# Patient Record
Sex: FEMALE | URBAN - METROPOLITAN AREA
[De-identification: names, ages, dates, MRNs, and addresses within clinical notes are randomized per-mention and may not be internally consistent; named-entity substitution may affect disease eponyms.]

---

## 2021-01-24 ENCOUNTER — EMERGENCY (EMERGENCY)
Facility: HOSPITAL | Age: 46
LOS: 1 days | Discharge: HOME | End: 2021-01-24
Attending: EMERGENCY MEDICINE
Payer: MEDICAID

## 2021-01-24 VITALS
HEART RATE: 68 BPM | TEMPERATURE: 98 F | SYSTOLIC BLOOD PRESSURE: 137 MMHG | OXYGEN SATURATION: 99 % | WEIGHT: 253.53 LBS | RESPIRATION RATE: 18 BRPM | DIASTOLIC BLOOD PRESSURE: 81 MMHG | HEIGHT: 66 IN

## 2021-01-24 PROCEDURE — 73130 X-RAY EXAM OF HAND: CPT

## 2021-01-24 PROCEDURE — 99283 EMERGENCY DEPT VISIT LOW MDM: CPT | Mod: 25

## 2021-01-24 PROCEDURE — 29130 APPL FINGER SPLINT STATIC: CPT | Mod: F6

## 2021-01-24 PROCEDURE — 99284 EMERGENCY DEPT VISIT MOD MDM: CPT | Mod: 25

## 2021-01-24 PROCEDURE — 73130 X-RAY EXAM OF HAND: CPT | Mod: 26,RT

## 2021-01-24 RX ORDER — ACETAMINOPHEN 500 MG
2 TABLET ORAL
Qty: 30 | Refills: 0
Start: 2021-01-24

## 2021-01-24 RX ORDER — IBUPROFEN 200 MG
1 TABLET ORAL
Qty: 30 | Refills: 0
Start: 2021-01-24

## 2021-01-24 NOTE — ED PROVIDER NOTE - MUSCULOSKELETAL, MLM
Soft tissue swelling of the proximal right phalanx of the right second digit. Decreased ROM secondary to pain. No tenderness to palpation of the middle and distal phalanx. No tenderness at the left MCP joint.

## 2021-01-24 NOTE — ED PROVIDER NOTE - CARE PLAN
Principal Discharge DX:	Closed nondisplaced fracture of middle phalanx of right index finger, initial encounter

## 2021-01-24 NOTE — ED PROVIDER NOTE - OBJECTIVE STATEMENT
45 year old female presenting to the ED with right index injury today. Patient states that she was bringing her hand back to strike something when she struck her proximal phalangeal joint on a piece of wood. Patient has had pain since and swelling. Patient with decreased ROM secondary to pain. Denies any weakness, numbness, or any other acute complaints.

## 2022-11-14 NOTE — ED PROVIDER NOTE - CONSTITUTIONAL, MLM
Amilcar   : 1984  Primary: Kuldeep Allison  Secondary:  Katie Dodson @ [address] [place] [address] Way [address]  Phone: [phone]  Fax: [phone] Plan Frequency: 2x/week for an additional 12 weeks    Plan of Care/Certification Expiration Date: 22      PT Visit Info: Total # of Visits to Date: 36     Visit Count:  36   OUTPATIENT PHYSICAL THERAPY:OP NOTE TYPE: Treatment Note 2022       Episode  Appt Desk             Treatment Diagnosis:  Pain in Right Shoulder (M25.511)  Stiffness of Right Shoulder, Not elsewhere classified (M25.611)   Strain of muscle(s) and tendon(s) of the rotator cuff of right shoulder, sequela (S46.011S)  Medical/Referring Diagnosis: s/p  Arthroscopy of right shoulder, arthroscopic subacromial decompression, arthroscopic distal clavicle resection, extensive debridement of SLAP tear, biceps labral complex, glenohumeral joint, subacromial space, mini-open rotator cuff repair and biceps tenodesis. Strain of muscle(s) and tendon(s) of the rotator cuff of right shoulder, initial encounter [S46.011A]  Referring Physician:  Pipe Cannon MD MD Orders:  eval & treat, home exercise program and Full Motion, Full Strength, Aggressive; 2x/week x6 weeks. (10/11/22)   Date of Onset:  Onset Date: 22 (surgery; 22 injury)  Allergies: Duloxetine and Erythromycin  Restrictions/Precautions: Post op restrictions and precautions R shoulder. Interventions Planned (Treatment may consist of any combination of the following):    Current Treatment Recommendations: Strengthening; ROM; Manual Therapy - Soft Tissue Mobilization; Manual Therapy - Joint Manipulation; Pain management; Home exercise program; Modalities     Subjective Comments: Pt states \"I was sore last night. \"  Initial:    6/10   Post Session:       no increase reported  Medications Last Reviewed: Naproxen; gabapen 2022  Updated Objective Findings:    ROM:   Not measured. Strength:   Not measured. Treatment   Active warm up on UBE. (5 min)  THERAPEUTIC EXERCISE: (40 minutes): Exercises for R shoulder motion and to increase ROM. Pt was supine with the scapula stabilized for all motions. Exercises for shoulder girdle strength and stabilization done as per grid. Verbal and tactile cues for these as needed for correct mechanics. MODALITIES: (15 minutes, un-billable): Cold and compression for post treatment soreness to R shoulder using Game[employer], low pressure, 40-deg F, x15' while in sitting. Good relief with this.    Date:  10/17/22 Date:  10/20/22 Date  10/24/22 Date  10/27/22 Date  11/3/22 Date  11/7/22 Date  11/10/22 Date  11/14/22   Activity/Exercise Parameters Parameters         UBE x10' X 5 min L 1 x10' L3 x 5 min L. 3 x10' Level 3 x 5 min Level 3 x 5 min 5 min   ROM/Flexibility At wall as above See above As above  As above  See above See above Wall slides x15, 4 way   Rhythmic Stabilization: Red Flex bar oscil   Shoulder neutral 3x20-30\" ea;  Wall dribble  500-g ball  12 o'clock   3x30 each  Red Flex bar oscil   Shoulder neutral, ER by side 1x30\" ea;  Wall dribble  1-kg ball  12 o'clock   3x30 each Red flex bar oscillations    Shoulder neutral 30 sec x 2  Flexion at 90 30 sec x 3    Wall dribble with 1 kg ball 12 o'clock 3 x30 each B shoulders Red Flex bar oscil   Shoulder neutral, ER by side, 90-deg scaption  2x30\" ea   Red flex bar oscillation at 90 degrees of flexion 30 sec hold x 2    Full flexion 30 sec    Strength:   Shoulder ext -  -  Standing bilat  7# cable  3x15  Standing 7# 3x10 cable Standing 7# 3x10 cable   Horiz abd/Middle Trap -  -  Prone bilat  1# 2x10  Prone B 1# 2x10 Prone B UE 2# 2x10   Scaption/Lower Trap -  -  Prone bilat  1# 2x10  Prone B 1# 2x10 Prone 2x10 B 1#   ER/IR-- 90 deg -  -  -      Serratus Press -  -  Bench press  3# 2x15  Bench press 5# 2x10 Bench press 3x10, 5#   Row -  -  Bent over   5# 2x15 ea  Prone on bench 5# 3x10    Standing at cable 2x10, 10# Cable 10# R UE 3x10   ER -- plane of scapula -  -  3# 2x15 ea      Overhead press -  45-deg incline  3#+green band pull apart  2x10 45 degree incline 3# B with green band pull apart 2x10 Standing  Landmine press with 3# bar  2x10   Standing landmine press with bar 3x10   Pull down     Sitting bilat  10# cable  2x15      Forward Elevation     unilat scaption   To 90-deg   1# 2x10 ea      CKC UE Wall push  Scap abd/add x10,  Shoulder Tap moving L only  2x5 Wall push up with scapula retraction 2x10    Shoulder tap moving 2x5 Quadruped   Respiratory belly lift bilat UE 1x5 breath,  R UE only 2x5 breath    Quadruped shoulder taps  1x5 Quadriped respiratory bell lift B UE  X10    Quadriped shoulder taps x10 -        HEP: She is to work on her HEP. She verbalizes understanding. [facility] Portal  7/5/22: [id number]  10/13/22: [id number] Initial scapulo-cuff strrength    Treatment/Session Summary:    Treatment Assessment: Pt fatigues easily but did not report increased pain. She required tactile cues to depress and retract the scapula. Communication/Consultation:  None today  Equipment provided today:  None  Recommendations/Intent for next treatment session: We will continue to progress R shoulder motion and re-strengthening as pain allows. Continue with modalities as needed. Review updated HEP.      Total Treatment Billable Duration:  40 minutes (15 min unbilled)  Time In: 6400  Time Out: [address], PTA       Charge Capture Post Session Pain  PT Visit Info  [facility] Portal  MD Guidelines  Scanned Media  Benefits  MyChart    Future Appointments   Date Time Provider Nader Jameson   11/17/2022  3:15 PM [facility] [place]   11/28/2022  1:45 PM Katy Valdivia, PTA [facility]   12/1/2022  3:15 PM Jensen Cast, PT [facility]   12/5/2022  1:45 PM Jensen Cast, PT [facility]   12/8/2022  1:45 PM Jensen Cast, PT [facility]   12/13/2022  1:45 PM Ad Morse Bernardino Pies [place], [place] [facility]   12/13/2022  2:45 PM Nuha Ruiz.MD [facility] Well appearing, awake, alert, oriented to person, place, time/situation and in no apparent distress. normal...

## 2024-10-07 NOTE — ED PROVIDER NOTE - PATIENT PORTAL LINK FT
You can access the FollowMyHealth Patient Portal offered by [facility] by registering at the following website: [email or web address]. By joining [employer]’s FollowMyHealth portal, you will also be able to view your health information using other applications (apps) compatible with our system. No